# Patient Record
Sex: FEMALE | Race: WHITE | Employment: UNEMPLOYED | URBAN - METROPOLITAN AREA
[De-identification: names, ages, dates, MRNs, and addresses within clinical notes are randomized per-mention and may not be internally consistent; named-entity substitution may affect disease eponyms.]

---

## 2024-02-25 ENCOUNTER — OFFICE VISIT (OUTPATIENT)
Age: 34
End: 2024-02-25

## 2024-02-25 VITALS
SYSTOLIC BLOOD PRESSURE: 135 MMHG | DIASTOLIC BLOOD PRESSURE: 81 MMHG | RESPIRATION RATE: 19 BRPM | OXYGEN SATURATION: 96 % | BODY MASS INDEX: 39.37 KG/M2 | WEIGHT: 245 LBS | HEART RATE: 78 BPM | HEIGHT: 66 IN | TEMPERATURE: 98 F

## 2024-02-25 DIAGNOSIS — L08.9 SKIN INFECTION: Primary | ICD-10-CM

## 2024-02-25 RX ORDER — CEPHALEXIN 500 MG/1
500 CAPSULE ORAL 3 TIMES DAILY
Qty: 15 CAPSULE | Refills: 0 | Status: SHIPPED | OUTPATIENT
Start: 2024-02-25 | End: 2024-03-01

## 2024-02-25 RX ORDER — DULOXETIN HYDROCHLORIDE 60 MG/1
CAPSULE, DELAYED RELEASE ORAL DAILY
COMMUNITY

## 2024-02-25 NOTE — PROGRESS NOTES
Allyson Cee (:  1990) is a 33 y.o. female,New patient, here for evaluation of the following chief complaint(s):  Blister (Breaking out in blisters started yesterday )      ASSESSMENT/PLAN:    ICD-10-CM    1. Skin infection  L08.9 cephALEXin (KEFLEX) 500 MG capsule     mupirocin (BACTROBAN) 2 % ointment        Patient is experiencing a skin infection, likely related to her new tattoo. No active drainage or signs of systemic infection at this time. She was started on Keflex and Mupirocin ointment. Encouraged her to clean the areas with antibacterial soap and refrain from scratching them. Wash her hands often. Monitor for signs of worsening infection and return for worsening or persistent symptoms. She is understanding and agreeable to this plan.     Dx Disposition: impetigo, allergic reaction  Education and handout provided on diagnosis and management of symptoms.   AVS reviewed with patient. Follow up as needed in UC or with PCP for new or worsening symptoms.   Return if symptoms worsen or fail to improve.    SUBJECTIVE/OBJECTIVE:  Patient presents to the clinic with complaints of itchy/painful blisters on her hands and near a new tattoo on her upper right arm. This started yesterday. She got her tattoo last weekend. Denies any fever or body aches. She has tried aquafor with little relief.        History provided by:  Patient   used: No        Vitals:    24 0902   BP: 135/81   Site: Right Upper Arm   Position: Sitting   Cuff Size: Large Adult   Pulse: 78   Resp: 19   Temp: 98 °F (36.7 °C)   TempSrc: Oral   SpO2: 96%   Weight: 111.1 kg (245 lb)   Height: 1.676 m (5' 6\")       Review of Systems   Constitutional:  Negative for chills, fatigue and fever.   Skin:  Positive for rash (to left pinky finger and right AC, right upper arm).       Physical Exam  Constitutional:       General: She is not in acute distress.     Appearance: Normal appearance. She is not ill-appearing.   HENT: